# Patient Record
Sex: FEMALE | Race: WHITE | Employment: UNEMPLOYED | URBAN - METROPOLITAN AREA
[De-identification: names, ages, dates, MRNs, and addresses within clinical notes are randomized per-mention and may not be internally consistent; named-entity substitution may affect disease eponyms.]

---

## 2024-04-01 ENCOUNTER — APPOINTMENT (OUTPATIENT)
Dept: ULTRASOUND IMAGING | Facility: HOSPITAL | Age: 29
End: 2024-04-01
Attending: EMERGENCY MEDICINE

## 2024-04-01 ENCOUNTER — HOSPITAL ENCOUNTER (EMERGENCY)
Facility: HOSPITAL | Age: 29
Discharge: HOME | End: 2024-04-01
Attending: EMERGENCY MEDICINE

## 2024-04-01 VITALS
HEIGHT: 66 IN | WEIGHT: 130 LBS | DIASTOLIC BLOOD PRESSURE: 59 MMHG | OXYGEN SATURATION: 97 % | TEMPERATURE: 98 F | BODY MASS INDEX: 20.89 KG/M2 | HEART RATE: 68 BPM | RESPIRATION RATE: 18 BRPM | SYSTOLIC BLOOD PRESSURE: 99 MMHG

## 2024-04-01 DIAGNOSIS — R10.9 ABDOMINAL PAIN AFFECTING PREGNANCY (HCC): Primary | ICD-10-CM

## 2024-04-01 DIAGNOSIS — N89.8 VAGINAL DISCHARGE: ICD-10-CM

## 2024-04-01 DIAGNOSIS — O26.899 ABDOMINAL PAIN AFFECTING PREGNANCY (HCC): Primary | ICD-10-CM

## 2024-04-01 LAB
ALBUMIN SERPL-MCNC: 3.8 G/DL (ref 3.4–5)
ALBUMIN/GLOB SERPL: 1.3 {RATIO} (ref 1–2)
ALP LIVER SERPL-CCNC: 48 U/L
ALT SERPL-CCNC: 10 U/L
ANION GAP SERPL CALC-SCNC: 5 MMOL/L (ref 0–18)
AST SERPL-CCNC: 6 U/L (ref 15–37)
B-HCG SERPL-ACNC: ABNORMAL MIU/ML
B-HCG UR QL: POSITIVE
BASOPHILS # BLD AUTO: 0.04 X10(3) UL (ref 0–0.2)
BASOPHILS NFR BLD AUTO: 0.5 %
BILIRUB SERPL-MCNC: 0.5 MG/DL (ref 0.1–2)
BILIRUB UR QL STRIP.AUTO: NEGATIVE
BUN BLD-MCNC: 7 MG/DL (ref 9–23)
CALCIUM BLD-MCNC: 9.4 MG/DL (ref 8.5–10.1)
CHLORIDE SERPL-SCNC: 107 MMOL/L (ref 98–112)
CO2 SERPL-SCNC: 27 MMOL/L (ref 21–32)
COLOR UR AUTO: YELLOW
CREAT BLD-MCNC: 0.83 MG/DL
EGFRCR SERPLBLD CKD-EPI 2021: 98 ML/MIN/1.73M2 (ref 60–?)
EOSINOPHIL # BLD AUTO: 0.07 X10(3) UL (ref 0–0.7)
EOSINOPHIL NFR BLD AUTO: 0.9 %
ERYTHROCYTE [DISTWIDTH] IN BLOOD BY AUTOMATED COUNT: 12.5 %
GLOBULIN PLAS-MCNC: 3 G/DL (ref 2.8–4.4)
GLUCOSE BLD-MCNC: 99 MG/DL (ref 70–99)
GLUCOSE UR STRIP.AUTO-MCNC: NORMAL MG/DL
HCT VFR BLD AUTO: 40 %
HGB BLD-MCNC: 14 G/DL
IMM GRANULOCYTES # BLD AUTO: 0.02 X10(3) UL (ref 0–1)
IMM GRANULOCYTES NFR BLD: 0.2 %
KETONES UR STRIP.AUTO-MCNC: NEGATIVE MG/DL
LEUKOCYTE ESTERASE UR QL STRIP.AUTO: 25
LIPASE SERPL-CCNC: 37 U/L (ref 13–75)
LYMPHOCYTES # BLD AUTO: 1.67 X10(3) UL (ref 1–4)
LYMPHOCYTES NFR BLD AUTO: 20.7 %
MCH RBC QN AUTO: 31.1 PG (ref 26–34)
MCHC RBC AUTO-ENTMCNC: 35 G/DL (ref 31–37)
MCV RBC AUTO: 88.9 FL
MONOCYTES # BLD AUTO: 0.56 X10(3) UL (ref 0.1–1)
MONOCYTES NFR BLD AUTO: 6.9 %
NEUTROPHILS # BLD AUTO: 5.7 X10 (3) UL (ref 1.5–7.7)
NEUTROPHILS # BLD AUTO: 5.7 X10(3) UL (ref 1.5–7.7)
NEUTROPHILS NFR BLD AUTO: 70.8 %
NITRITE UR QL STRIP.AUTO: NEGATIVE
OSMOLALITY SERPL CALC.SUM OF ELEC: 286 MOSM/KG (ref 275–295)
PH UR STRIP.AUTO: 8 [PH] (ref 5–8)
PLATELET # BLD AUTO: 260 10(3)UL (ref 150–450)
POTASSIUM SERPL-SCNC: 4.3 MMOL/L (ref 3.5–5.1)
PROT SERPL-MCNC: 6.8 G/DL (ref 6.4–8.2)
PROT UR STRIP.AUTO-MCNC: 20 MG/DL
RBC # BLD AUTO: 4.5 X10(6)UL
RBC UR QL AUTO: NEGATIVE
SODIUM SERPL-SCNC: 139 MMOL/L (ref 136–145)
SP GR UR STRIP.AUTO: 1.03 (ref 1–1.03)
UROBILINOGEN UR STRIP.AUTO-MCNC: NORMAL MG/DL
WBC # BLD AUTO: 8.1 X10(3) UL (ref 4–11)

## 2024-04-01 PROCEDURE — 36415 COLL VENOUS BLD VENIPUNCTURE: CPT

## 2024-04-01 PROCEDURE — 85025 COMPLETE CBC W/AUTO DIFF WBC: CPT | Performed by: EMERGENCY MEDICINE

## 2024-04-01 PROCEDURE — 81514 NFCT DS BV&VAGINITIS DNA ALG: CPT | Performed by: EMERGENCY MEDICINE

## 2024-04-01 PROCEDURE — 99285 EMERGENCY DEPT VISIT HI MDM: CPT

## 2024-04-01 PROCEDURE — 87491 CHLMYD TRACH DNA AMP PROBE: CPT | Performed by: EMERGENCY MEDICINE

## 2024-04-01 PROCEDURE — 81001 URINALYSIS AUTO W/SCOPE: CPT | Performed by: EMERGENCY MEDICINE

## 2024-04-01 PROCEDURE — 87591 N.GONORRHOEAE DNA AMP PROB: CPT | Performed by: EMERGENCY MEDICINE

## 2024-04-01 PROCEDURE — 83690 ASSAY OF LIPASE: CPT | Performed by: EMERGENCY MEDICINE

## 2024-04-01 PROCEDURE — 76817 TRANSVAGINAL US OBSTETRIC: CPT | Performed by: EMERGENCY MEDICINE

## 2024-04-01 PROCEDURE — 81025 URINE PREGNANCY TEST: CPT

## 2024-04-01 PROCEDURE — 80053 COMPREHEN METABOLIC PANEL: CPT | Performed by: EMERGENCY MEDICINE

## 2024-04-01 PROCEDURE — 76801 OB US < 14 WKS SINGLE FETUS: CPT | Performed by: EMERGENCY MEDICINE

## 2024-04-01 PROCEDURE — 84702 CHORIONIC GONADOTROPIN TEST: CPT | Performed by: EMERGENCY MEDICINE

## 2024-04-01 PROCEDURE — 81001 URINALYSIS AUTO W/SCOPE: CPT

## 2024-04-01 RX ORDER — ONDANSETRON 4 MG/1
4 TABLET, ORALLY DISINTEGRATING ORAL EVERY 4 HOURS PRN
Qty: 10 TABLET | Refills: 0 | Status: SHIPPED | OUTPATIENT
Start: 2024-04-01 | End: 2024-04-08

## 2024-04-01 NOTE — ED PROVIDER NOTES
Patient Seen in: [facility] Emergency Department      History     Chief Complaint   Patient presents with    Abdomen/Flank Pain    Nausea/Vomiting/Diarrhea    Woody     Stated Complaint: abdominal pain, vaginal discharge for 2 weeks    Subjective:   HPI    28-year-old female presents reporting pain to the suprapubic region with vaginal discharge over the last 2 weeks.  She describes green vaginal discharge.  LMP was about 6 weeks ago.  Denies any vaginal bleeding.  She describes a cramping sensation in the suprapubic region.  No aggravating or alleviating factors.  Intermittent.  No fevers.  No vomiting or diarrhea but she has had some nausea.    Objective:   History reviewed. No pertinent past medical history.           History reviewed. No pertinent surgical history.             Social History     Socioeconomic History    Marital status: Single   Tobacco Use    Smoking status: Never    Smokeless tobacco: Never   Vaping Use    Vaping Use: Every day              Review of Systems    Positive for stated complaint: abdominal pain, vaginal discharge for 2 weeks  Other systems are as noted in HPI.  Constitutional and vital signs reviewed.      All other systems reviewed and negative except as noted above.    Physical Exam     ED Triage Vitals [04/01/24 1111]   BP 91/57   Pulse 80   Resp 14   Temp 97.8 °F (36.6 °C)   Temp src Oral   SpO2 99 %   O2 Device None (Room air)       Current:BP 99/59   Pulse 68   Temp 97.8 °F (36.6 °C) (Oral)   Resp 18   Ht 167.6 cm (5' 6\")   Wt 59 kg   LMP 02/26/2024 (Approximate)   SpO2 97%   BMI 20.98 kg/m²         Physical Exam    General:  Vitals as listed.  No acute distress   HEENT: Poor dentition.  Oropharynx clear, mucous membranes moist   Lungs: good air exchange and clear   Heart: regular rate rhythm and no murmur   Abdomen: Normal tenderness on palpation to the suprapubic region.  No abdominal masses.  No peritoneal signs  : No external masses or lesions.   Nontender.  White physiologic appearing discharge in the vaginal vault  Extremities: no edema, normal peripheral pulses   Neuro: Alert oriented and nonfocal   Skin: no rashes or nodules    ED Course     Labs Reviewed   URINALYSIS, ROUTINE - Abnormal; Notable for the following components:       Result Value    Clarity Urine Turbid (*)     Protein Urine 20 (*)     Leukocyte Esterase Urine 25 (*)     Squamous Epi. Cells Few (*)     All other components within normal limits   COMP METABOLIC PANEL (14) - Abnormal; Notable for the following components:    BUN 7 (*)     AST 6 (*)     ALT 10 (*)     All other components within normal limits   HCG, BETA SUBUNIT (QUANT PREGNANCY TEST) - Abnormal; Notable for the following components:    Hcg Quantitative 14,797.0 (*)     All other components within normal limits   POCT PREGNANCY URINE - Abnormal; Notable for the following components:    POCT Urine Pregnancy Positive (*)     All other components within normal limits   LIPASE - Normal   CBC WITH DIFFERENTIAL WITH PLATELET    Narrative:     The following orders were created for panel order CBC With Differential With Platelet.  Procedure                               Abnormality         Status                     ---------                               -----------         ------                     CBC W/ DIFFERENTIAL[[id number]]                              Final result                 Please view results for these tests on the individual orders.   CHLAMYDIA/GONOCOCCUS, MONI   VAGINITIS VAGINOSIS PCR PANEL   CBC W/ DIFFERENTIAL             US PREG 1ST TRIM W/EV (CPT=76801/77763)    Result Date: 4/1/2024  PROCEDURE:  US OB W/ EV 1ST TRIMESTER (CPT=76801/47693)  COMPARISON:  None.  INDICATIONS:  abdominal pain, vaginal discharge for 2 weeks, vomiting today  TECHNIQUE:  Transabdominal and endovaginal pelvic ultrasound examinations were performed.  PATIENT STATED HISTORY: (As transcribed by Technologist)     MEASUREMENTS:  Gestational  Age:               5 weeks 0 days by LMP. Fetal Heart Rate:               Not detected Left Ovary:                       5.43 cm x 4.01 cm x 5.58 cm Right Ovary:                     2.18 cm x 2.39 cm x 1.29 cm    FINDINGS:  GESTATIONAL SAC:  Present measuring 1.3 x 0.4 x 1.0 cm FETAL POLE:  Present and normal appearing. YOLK SAC:  Present.  Measures approximately 2 millimeters CARDIAC ACTIVITY:  Not detected UTERUS:  Normal. PLACENTA:  Not present  RIGHT OVARY:  Normal. LEFT OVARY:  Cyst with layering debris is present, measuring up to 4.5 centimeters. CUL-DE-SAC:  Small volume free fluid. CLINICAL AGE:  Normal SONOGRAPHIC AGE:  Normal OTHER:  Suspected possible 7 mm sub gestational hematoma.            CONCLUSION:  1. Intrauterine gestational sac measuring up to 1.3 cm.  Small yolk sac is identified, however no fetal pole is present.  This may be secondary to early gestation, close clinical follow-up and ultrasound evaluation is recommended. 2. There is a possible small 7 mm sub gestational hematoma.  Attention on follow-up is recommended.  Clinical correlation is recommended. 3. Small amount of pelvic free fluid, which is nonspecific. 4. There is a 4.5 cm left ovarian cyst containing intraluminal debris, possibly corpus luteum.  Attention on follow-up is recommended.    LOCATION:  [facility]   Dictated by (CST): Ab Bingham MD on 4/01/2024 at 5:04 PM     Finalized by (CST): Ab Bingham MD on 4/01/2024 at 5:09 PM               MDM      28-year-old female presents reporting pain to the suprapubic region with vaginal discharge.  LMP was about 6 weeks ago denies any vomiting or diarrhea but has been nauseous.  No fevers.  No urinary symptoms.    Differential includes but is not limited to STI, PID, pregnancy, UTI, a life threat.    CBC, CMP, urine pregnancy test, vaginitis panel, GC/chlamydia swab ordered for further evaluation.    The patient's urine pregnancy test came back positive and a beta hCG was added as  well as ultrasound of the pelvis.  Additional considerations based on positive pregnancy test include the possibility of an ectopic pregnancy versus IUP    My independent interpretation of ultrasound is that there is evidence of an IUP.    Radiology reports no obvious ectopic pregnancy.    Patient did have some nausea and vomited 1 time here.  Her labs are unremarkable and ultrasound shows IUP.  Recommend follow-up with OB/GYN for further management.  Zofran prescribed for nausea.  Urinalysis does not show evidence of a UTI.  Vaginal swabs are in process and discussed with patient that she will receive a call if any of them returned positive that require medical management.  She is comfortable with this plan.                                       Medical Decision Making      Disposition and Plan     Clinical Impression:  1. Abdominal pain affecting pregnancy (HCC)    2. Vaginal discharge         Disposition:  Discharge  4/1/2024  6:06 pm    Follow-up:  Delisa Mathis MD  [address] [place] [address]  [phone]    Follow up  OB/GYN for further management of your pregnancy          Medications Prescribed:  Current Discharge Medication List        START taking these medications    Details   ondansetron 4 MG Oral Tablet Dispersible Take 1 tablet (4 mg total) by mouth every 4 (four) hours as needed for Nausea.  Qty: 10 tablet, Refills: 0

## 2024-04-01 NOTE — ED INITIAL ASSESSMENT (HPI)
Pt c/o \"nasty colored discharge\" x 2 weeks from her vagina, also c/o abdominal pain and nausea.

## 2024-04-02 LAB
BV BACTERIA DNA VAG QL NAA+PROBE: NEGATIVE
C GLABRATA DNA VAG QL NAA+PROBE: NEGATIVE
C KRUSEI DNA VAG QL NAA+PROBE: NEGATIVE
CANDIDA DNA VAG QL NAA+PROBE: POSITIVE
T VAGINALIS DNA VAG QL NAA+PROBE: NEGATIVE

## 2024-04-02 NOTE — PROGRESS NOTES
ED Culture Callback Results Review    Pharmacist reviewed culture results from ED visit .    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    A new prescription for miconazole 2% cream insert 5g vaginally at bedtime for 7 days was discussed with and approved by Dr. Cummings. A prescription was not electronically sent due to no pharmacy on file.  The patient was attempted to be contacted by phone, informed of the results, educated regarding the new therapy, and asked which pharmacy the new prescription should be electronically sent to but there was no answer. A HIPAA compliant voicemail was left.    Sherri Gracia PharmD   Emergency Medicine Pharmacist Specialist  04/02/24; 12:55 PM

## 2024-04-27 ENCOUNTER — APPOINTMENT (OUTPATIENT)
Dept: ULTRASOUND IMAGING | Facility: HOSPITAL | Age: 29
End: 2024-04-27
Attending: EMERGENCY MEDICINE

## 2024-04-27 ENCOUNTER — HOSPITAL ENCOUNTER (EMERGENCY)
Facility: HOSPITAL | Age: 29
Discharge: HOME | End: 2024-04-27
Attending: EMERGENCY MEDICINE

## 2024-04-27 VITALS
OXYGEN SATURATION: 99 % | TEMPERATURE: 98 F | RESPIRATION RATE: 20 BRPM | BODY MASS INDEX: 23 KG/M2 | DIASTOLIC BLOOD PRESSURE: 59 MMHG | HEART RATE: 51 BPM | WEIGHT: 140 LBS | SYSTOLIC BLOOD PRESSURE: 93 MMHG

## 2024-04-27 DIAGNOSIS — K82.8 GALLBLADDER SLUDGE: ICD-10-CM

## 2024-04-27 DIAGNOSIS — R10.9 ABDOMINAL PAIN DURING PREGNANCY IN FIRST TRIMESTER (HCC): Primary | ICD-10-CM

## 2024-04-27 DIAGNOSIS — O26.891 ABDOMINAL PAIN DURING PREGNANCY IN FIRST TRIMESTER (HCC): Primary | ICD-10-CM

## 2024-04-27 DIAGNOSIS — E87.6 HYPOKALEMIA: ICD-10-CM

## 2024-04-27 LAB
ALBUMIN SERPL-MCNC: 3.3 G/DL (ref 3.4–5)
ALBUMIN/GLOB SERPL: 1.1 {RATIO} (ref 1–2)
ALP LIVER SERPL-CCNC: 50 U/L
ALT SERPL-CCNC: 14 U/L
ANION GAP SERPL CALC-SCNC: 9 MMOL/L (ref 0–18)
AST SERPL-CCNC: 11 U/L (ref 15–37)
B-HCG SERPL-ACNC: ABNORMAL MIU/ML
B-HCG UR QL: POSITIVE
BASOPHILS # BLD AUTO: 0.05 X10(3) UL (ref 0–0.2)
BASOPHILS NFR BLD AUTO: 0.6 %
BILIRUB SERPL-MCNC: 0.4 MG/DL (ref 0.1–2)
BILIRUB UR QL STRIP.AUTO: NEGATIVE
BUN BLD-MCNC: 6 MG/DL (ref 9–23)
CALCIUM BLD-MCNC: 9 MG/DL (ref 8.5–10.1)
CHLORIDE SERPL-SCNC: 104 MMOL/L (ref 98–112)
CLARITY UR REFRACT.AUTO: CLEAR
CO2 SERPL-SCNC: 25 MMOL/L (ref 21–32)
CREAT BLD-MCNC: 0.61 MG/DL
EGFRCR SERPLBLD CKD-EPI 2021: 125 ML/MIN/1.73M2 (ref 60–?)
EOSINOPHIL # BLD AUTO: 0.1 X10(3) UL (ref 0–0.7)
EOSINOPHIL NFR BLD AUTO: 1.1 %
ERYTHROCYTE [DISTWIDTH] IN BLOOD BY AUTOMATED COUNT: 12.2 %
GLOBULIN PLAS-MCNC: 3.1 G/DL (ref 2.8–4.4)
GLUCOSE BLD-MCNC: 132 MG/DL (ref 70–99)
GLUCOSE UR STRIP.AUTO-MCNC: NORMAL MG/DL
HCT VFR BLD AUTO: 34.5 %
HGB BLD-MCNC: 12.6 G/DL
IMM GRANULOCYTES # BLD AUTO: 0.04 X10(3) UL (ref 0–1)
IMM GRANULOCYTES NFR BLD: 0.5 %
KETONES UR STRIP.AUTO-MCNC: NEGATIVE MG/DL
LEUKOCYTE ESTERASE UR QL STRIP.AUTO: NEGATIVE
LIPASE SERPL-CCNC: 28 U/L (ref 13–75)
LYMPHOCYTES # BLD AUTO: 1.9 X10(3) UL (ref 1–4)
LYMPHOCYTES NFR BLD AUTO: 21.7 %
MCH RBC QN AUTO: 31.4 PG (ref 26–34)
MCHC RBC AUTO-ENTMCNC: 36.5 G/DL (ref 31–37)
MCV RBC AUTO: 86 FL
MONOCYTES # BLD AUTO: 0.47 X10(3) UL (ref 0.1–1)
MONOCYTES NFR BLD AUTO: 5.4 %
NEUTROPHILS # BLD AUTO: 6.19 X10 (3) UL (ref 1.5–7.7)
NEUTROPHILS # BLD AUTO: 6.19 X10(3) UL (ref 1.5–7.7)
NEUTROPHILS NFR BLD AUTO: 70.7 %
NITRITE UR QL STRIP.AUTO: NEGATIVE
OSMOLALITY SERPL CALC.SUM OF ELEC: 285 MOSM/KG (ref 275–295)
PH UR STRIP.AUTO: 7.5 [PH] (ref 5–8)
PLATELET # BLD AUTO: 248 10(3)UL (ref 150–450)
POTASSIUM SERPL-SCNC: 3.3 MMOL/L (ref 3.5–5.1)
PROT SERPL-MCNC: 6.4 G/DL (ref 6.4–8.2)
PROT UR STRIP.AUTO-MCNC: NEGATIVE MG/DL
RBC # BLD AUTO: 4.01 X10(6)UL
RBC UR QL AUTO: NEGATIVE
RH BLOOD TYPE: POSITIVE
SODIUM SERPL-SCNC: 138 MMOL/L (ref 136–145)
SP GR UR STRIP.AUTO: 1.02 (ref 1–1.03)
UROBILINOGEN UR STRIP.AUTO-MCNC: NORMAL MG/DL
WBC # BLD AUTO: 8.8 X10(3) UL (ref 4–11)

## 2024-04-27 PROCEDURE — 99285 EMERGENCY DEPT VISIT HI MDM: CPT

## 2024-04-27 PROCEDURE — 36415 COLL VENOUS BLD VENIPUNCTURE: CPT

## 2024-04-27 PROCEDURE — 80053 COMPREHEN METABOLIC PANEL: CPT | Performed by: EMERGENCY MEDICINE

## 2024-04-27 PROCEDURE — 83690 ASSAY OF LIPASE: CPT | Performed by: EMERGENCY MEDICINE

## 2024-04-27 PROCEDURE — 76817 TRANSVAGINAL US OBSTETRIC: CPT | Performed by: EMERGENCY MEDICINE

## 2024-04-27 PROCEDURE — 81025 URINE PREGNANCY TEST: CPT

## 2024-04-27 PROCEDURE — 85025 COMPLETE CBC W/AUTO DIFF WBC: CPT | Performed by: EMERGENCY MEDICINE

## 2024-04-27 PROCEDURE — 81003 URINALYSIS AUTO W/O SCOPE: CPT | Performed by: EMERGENCY MEDICINE

## 2024-04-27 PROCEDURE — 93975 VASCULAR STUDY: CPT | Performed by: EMERGENCY MEDICINE

## 2024-04-27 PROCEDURE — 76801 OB US < 14 WKS SINGLE FETUS: CPT | Performed by: EMERGENCY MEDICINE

## 2024-04-27 PROCEDURE — 86900 BLOOD TYPING SEROLOGIC ABO: CPT | Performed by: EMERGENCY MEDICINE

## 2024-04-27 PROCEDURE — 99284 EMERGENCY DEPT VISIT MOD MDM: CPT

## 2024-04-27 PROCEDURE — 76700 US EXAM ABDOM COMPLETE: CPT | Performed by: EMERGENCY MEDICINE

## 2024-04-27 PROCEDURE — 84702 CHORIONIC GONADOTROPIN TEST: CPT | Performed by: EMERGENCY MEDICINE

## 2024-04-27 PROCEDURE — 86901 BLOOD TYPING SEROLOGIC RH(D): CPT | Performed by: EMERGENCY MEDICINE

## 2024-04-27 RX ORDER — POTASSIUM CHLORIDE 20 MEQ/1
40 TABLET, EXTENDED RELEASE ORAL ONCE
Status: COMPLETED | OUTPATIENT
Start: 2024-04-27 | End: 2024-04-27

## 2024-04-27 NOTE — ED INITIAL ASSESSMENT (HPI)
Patient presenting with abdominal pain, per patient she is 9 weeks pregnant. Patient was here on 4/1/2024 with similar complain, didn't follow up with OB/GYN because she doesn't have one.

## 2024-04-27 NOTE — ED PROVIDER NOTES
Patient Seen in: [facility] Emergency Department      History     Chief Complaint   Patient presents with    Abdomen/Flank Pain    Pregnancy Issues     Stated Complaint: 9 weeks preg and having pain    Subjective:   HPI    28-year-old female who is  comes to the hospital with a complaint of continued abdominal pain.  She is having no vaginal bleeding.  She will occasionally have nausea and vomiting.  She was here similarly earlier in the month and had a workup including an ultrasound which did not definitively identify a viable pregnancy at the time.  She denies any headaches or dizziness but she has no chest pain or troubles breathing.  She has some mild dysuria.  She is denying any other specific complaints.    Objective:   History reviewed. No pertinent past medical history.           History reviewed. No pertinent surgical history.             Social History     Socioeconomic History    Marital status: Single   Tobacco Use    Smoking status: Never    Smokeless tobacco: Never   Vaping Use    Vaping status: Every Day              Review of Systems    Positive for stated complaint: 9 weeks preg and having pain  Other systems are as noted in HPI.  Constitutional and vital signs reviewed.      All other systems reviewed and negative except as noted above.    Physical Exam     ED Triage Vitals [24 1346]   /65   Pulse 68   Resp 20   Temp 97.9 °F (36.6 °C)   Temp src Temporal   SpO2 100 %   O2 Device None (Room air)       Current:/65   Pulse 51   Temp 97.9 °F (36.6 °C) (Temporal)   Resp 20   Wt 63.5 kg   LMP 2024 (Approximate)   SpO2 98%   BMI 22.60 kg/m²         Physical Exam    HEENT : NCAT, EOMI, PEERL,  neck supple, no JVD, trachea midline, No LAD  Heart: S1S2 normal. No murmurs, regular rate and rhythm  Lungs: Clear to auscultation bilaterally  Abdomen: Soft.  Medical and suprapubic regions are tender nondistended normal active bowel sounds without rebound, guarding or  masses noted  Back nontender without CVA tenderness  Extremity no clubbing, cyanosis or edema noted.  Full range of motion noted without tenderness  Neuro: No focal deficits noted    All measures to prevent infection transmission during my interaction with the patient were taken.  The patient was already wearing droplet mask on my arrival to the room.  Personal protective equipment including a droplet mask as well as gloves were worn throughout the duration of my exam.  Hand washing was performed prior to and after the exam.  Stethoscope and equipment used during my examination was cleaned with a super Sani cloth germicidal wipe following the exam.    ED Course     Labs Reviewed   HCG, BETA SUBUNIT (QUANT PREGNANCY TEST) - Abnormal; Notable for the following components:       Result Value    Hcg Quantitative 43,742.0 (*)     All other components within normal limits   COMP METABOLIC PANEL (14) - Abnormal; Notable for the following components:    Glucose 132 (*)     Potassium 3.3 (*)     BUN 6 (*)     AST 11 (*)     Albumin 3.3 (*)     All other components within normal limits   POCT PREGNANCY URINE - Abnormal; Notable for the following components:    POCT Urine Pregnancy Positive (*)     All other components within normal limits   CBC W/ DIFFERENTIAL - Abnormal; Notable for the following components:    HCT 34.5 (*)     All other components within normal limits   LIPASE - Normal   URINALYSIS, ROUTINE   CBC WITH DIFFERENTIAL WITH PLATELET    Narrative:     The following orders were created for panel order CBC With Differential With Platelet.  Procedure                               Abnormality         Status                     ---------                               -----------         ------                     CBC W/ DIFFERENTIAL[[id number]]          Abnormal            Final result                 Please view results for these tests on the individual orders.   ABORH (BLOOD TYPE)          ED Course as of 04/27/24  1731  ------------------------------------------------------------  Time: 04/27 1727  Comment: While here the patient had a pelvic ultrasound that I interpreted showing a live IUP.  I reviewed the radiology report as well.  The patient's abdominal ultrasound shows some gallbladder sludge.  Her lipase and urine were normal.  Her white count was 8.8 thousand.  Her LFTs were normal.  Potassium was 3.3 and replaced while here.     US ABDOMEN COMPLETE (CPT=76700)    Result Date: 4/27/2024  PROCEDURE:  US ABDOMEN COMPLETE (CPT=76700)  COMPARISON:  None.  INDICATIONS:  9 weeks preg and having pain  TECHNIQUE:  Real time gray-scale ultrasound was used to evaluate the abdomen.  The exam includes images of the liver, gallbladder, common bile duct, pancreas, spleen, kidneys, IVC, and aorta.  PATIENT STATED HISTORY: (As transcribed by Technologist)  Generalized Abdominal pain for several weeks. Early pregnancy.    FINDINGS:  LIVER:  Normal size and echogenicity. No significant masses. BILIARY:  Normal appearing intrahepatic ducts, and common bile duct.  Common bile duct diameter is 2 mm.  Sludge in the gallbladder. PANCREAS:  Normal. SPLEEN:  Normal. KIDNEYS:  Normal.  Right kidney measures 10.3 cm.  Left kidney measures 10.6 cm. AORTA/IVC:  Normal.             CONCLUSION:   Sludge in the gallbladder.  No evidence of cholecystitis.   LOCATION:  [facility]    Dictated by (CST): Derek Aragon MD on 4/27/2024 at 5:21 PM     Finalized by (CST): Derek Aragon MD on 4/27/2024 at 5:21 PM       US PREG 1ST TRIM W/EV/DOP (CPT=76801/03407/20053)    Result Date: 4/27/2024  PROCEDURE:  US PREG 1ST TRIM W/EV/DOP (CPT=76801/65577/44400)  COMPARISON:  US [facility], US PREG 1ST TRIM W/EV (CPT=76801/73865), 4/01/2024, 4:05 PM.  INDICATIONS:  9 weeks preg and having pain  TECHNIQUE:  Ultrasound examination for obstetrical and fetal evaluation was performed with a transabdominal and transvaginal probe. Doppler evaluation of the ovaries was  performed of the ovarian arteries and veins.  B-mode images, Doppler color flow, and  spectral waveform analysis were performed. Transvaginal ultrasound was used to better evaluate adnexal and endometrial detail. .  PATIENT STATED HISTORY: (As transcribed by Technologist)  Early pregnancy, generalized abdominal pain for several weeks. Denies bleeding or cramping    MEASUREMENTS:  Fetal Heart Rate:               161.00 H.B./min [place] Rump Length:       2.71 cm Left Ovary:                       6.2 x 1.2 x 4.0 cm.  Cyst measures 5.5 x 3.1 x 4.2 cm.  Arterial and venous waveforms are demonstrated. Right Ovary:                     Not visualized due to adjacent bowel gas.  FINDINGS:  GESTATIONAL SAC:  Present and normal appearing. FETAL POLE:  Present and normal appearing. YOLK SAC:  Present. CARDIAC ACTIVITY:  163 beats per minute UTERUS:  Normal. PLACENTA:  Partially visualized  RIGHT OVARY:  Not visualized. LEFT OVARY:  Arterial and venous waveforms are demonstrated. CUL-DE-SAC:  Normal. CLINICAL AGE:  13 weeks 1 day SONOGRAPHIC AGE:  9 weeks 4 days +/-6 days.  Estimated date of delivery is 11/26/2024  OTHER:  Negative.   OVARIAN DOPPLER:   FLOW:    There is normal vascularity in both ovaries with color Doppler.  There are normal arterial and venous waveforms in both ovaries upon spectral Doppler analysis.             CONCLUSION:   1. Large cystic lesion in the left ovary is again demonstrated.  Arterial and venous waveforms are demonstrated.  2. Live intrauterine gestation.    LOCATION:  [facility]   Dictated by (CST): Derek Aragon MD on 4/27/2024 at 5:17 PM     Finalized by (CST): Derek Aragon MD on 4/27/2024 at 5:21 PM       US PREG 1ST TRIM W/EV (CPT=76801/74665)    Result Date: 4/1/2024  PROCEDURE:  US OB W/ EV 1ST TRIMESTER (CPT=76801/61107)  COMPARISON:  None.  INDICATIONS:  abdominal pain, vaginal discharge for 2 weeks, vomiting today  TECHNIQUE:  Transabdominal and endovaginal pelvic ultrasound  examinations were performed.  PATIENT STATED HISTORY: (As transcribed by Technologist)     MEASUREMENTS:  Gestational Age:               5 weeks 0 days by LMP. Fetal Heart Rate:               Not detected Left Ovary:                       5.43 cm x 4.01 cm x 5.58 cm Right Ovary:                     2.18 cm x 2.39 cm x 1.29 cm    FINDINGS:  GESTATIONAL SAC:  Present measuring 1.3 x 0.4 x 1.0 cm FETAL POLE:  Present and normal appearing. YOLK SAC:  Present.  Measures approximately 2 millimeters CARDIAC ACTIVITY:  Not detected UTERUS:  Normal. PLACENTA:  Not present  RIGHT OVARY:  Normal. LEFT OVARY:  Cyst with layering debris is present, measuring up to 4.5 centimeters. CUL-DE-SAC:  Small volume free fluid. CLINICAL AGE:  Normal SONOGRAPHIC AGE:  Normal OTHER:  Suspected possible 7 mm sub gestational hematoma.            CONCLUSION:  1. Intrauterine gestational sac measuring up to 1.3 cm.  Small yolk sac is identified, however no fetal pole is present.  This may be secondary to early gestation, close clinical follow-up and ultrasound evaluation is recommended. 2. There is a possible small 7 mm sub gestational hematoma.  Attention on follow-up is recommended.  Clinical correlation is recommended. 3. Small amount of pelvic free fluid, which is nonspecific. 4. There is a 4.5 cm left ovarian cyst containing intraluminal debris, possibly corpus luteum.  Attention on follow-up is recommended.    LOCATION:  [facility]   Dictated by (CST): Ab Bingham MD on 4/01/2024 at 5:04 PM     Finalized by (CST): Ab Bingham MD on 4/01/2024 at 5:09 PM        Medications   potassium chloride (Klor-Con M20) tab 40 mEq (has no administration in time range)              MDM      Differential diagnosis includes miscarriage, urinary tract infection, pancreatitis, gallbladder disease but not limited such.  The patient at this time does have some sludge but no significant tenderness in such.  Her pregnancy on ultrasound appears to be normal.   Rest of her testing is negative.  Potassium was replaced at this time patient will be discharged home to follow-up with OB for further outpatient care.    Patient was screened and evaluated during this visit.   As a treating physician attending to the patient, I determined, within reasonable clinical confidence and prior to discharge, that an emergency medical condition was not or was no longer present.  There was no indication for further evaluation, treatment or admission on an emergency basis.       The usual and customary discharge instuctions were discussed given the patient's ER course.  We discussed signs and symptoms that should prompt the patient's immediate return to the emergency department.   Reasonable over the counter and prescription treatment options and Physician follow up plan was discussed.       The patient is discharged in good condition.     This note was prepared using Dragon Medical voice recognition dictation software.  As a result errors may occur.  When identified to these areas have been corrected.  While every attempt is made to correct errors during dictation discrepancies may still exist.  Please contact if there are any errors.                                   Medical Decision Making      Disposition and Plan     Clinical Impression:  1. Abdominal pain during pregnancy in first trimester (HCC)    2. Hypokalemia    3. Gallbladder sludge         Disposition:  Discharge  4/27/2024  5:30 pm    Follow-up:  Dima Hamilton MD  [address] [place] [address]  [phone]    Schedule an appointment as soon as possible for a visit in 3 day(s)            Medications Prescribed:  Current Discharge Medication List